# Patient Record
Sex: MALE | URBAN - METROPOLITAN AREA
[De-identification: names, ages, dates, MRNs, and addresses within clinical notes are randomized per-mention and may not be internally consistent; named-entity substitution may affect disease eponyms.]

---
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
## 2022-10-19 NOTE — PROCEDURE: SUNSCREEN RECOMMENDATIONS
I called this in for him today.       Incoming Refill Request      Medication requested (name and dose): CLONIDINE 0.3 MG TAB    Pharmacy where request should be sent: [facility]    Additional details provided by patient: PHARMACY STATES RX IS NOT THERE.     Best call back number: [phone]    Does the patient have less than a 3 day supply:  [x] Yes  [] No    Awilda Mata Rep  10/19/22, 15:13 EDT          
General Sunscreen Counseling: I recommended a broad spectrum sunscreen with a SPF of 30 or higher.  I explained that SPF 30 sunscreens block approximately 97 percent of the sun's harmful rays.  Sunscreens should be applied at least 15 minutes prior to expected sun exposure and then every 2 hours after that as long as sun exposure continues. If swimming or exercising sunscreen should be reapplied every 45 minutes to an hour after getting wet or sweating.  One ounce, or the equivalent of a shot glass full of sunscreen, is adequate to protect the skin not covered by a bathing suit. I also recommended a lip balm with a sunscreen as well. Sun protective clothing can be used in lieu of sunscreen but must be worn the entire time you are exposed to the sun's rays.
Detail Level: Detailed